# Patient Record
Sex: FEMALE | Race: WHITE | NOT HISPANIC OR LATINO | Employment: FULL TIME | URBAN - NONMETROPOLITAN AREA
[De-identification: names, ages, dates, MRNs, and addresses within clinical notes are randomized per-mention and may not be internally consistent; named-entity substitution may affect disease eponyms.]

---

## 2018-01-18 ENCOUNTER — OFFICE VISIT (OUTPATIENT)
Dept: NEUROSURGERY | Facility: CLINIC | Age: 58
End: 2018-01-18

## 2018-01-18 VITALS
WEIGHT: 196 LBS | DIASTOLIC BLOOD PRESSURE: 70 MMHG | SYSTOLIC BLOOD PRESSURE: 180 MMHG | HEIGHT: 64 IN | BODY MASS INDEX: 33.46 KG/M2

## 2018-01-18 DIAGNOSIS — E66.3 OVER WEIGHT: ICD-10-CM

## 2018-01-18 DIAGNOSIS — F17.210 CIGARETTE SMOKER: ICD-10-CM

## 2018-01-18 DIAGNOSIS — M54.12 CERVICAL RADICULOPATHY: Primary | ICD-10-CM

## 2018-01-18 PROCEDURE — 99203 OFFICE O/P NEW LOW 30 MIN: CPT | Performed by: NEUROLOGICAL SURGERY

## 2018-01-18 RX ORDER — OSELTAMIVIR PHOSPHATE 75 MG/1
CAPSULE ORAL
COMMUNITY
Start: 2017-12-30

## 2018-01-18 RX ORDER — GABAPENTIN 300 MG/1
300 CAPSULE ORAL 3 TIMES DAILY
Qty: 90 CAPSULE | Refills: 0 | Status: SHIPPED | OUTPATIENT
Start: 2018-01-18

## 2018-01-18 RX ORDER — HYDROCODONE BITARTRATE AND ACETAMINOPHEN 5; 325 MG/1; MG/1
TABLET ORAL
COMMUNITY
Start: 2017-12-27

## 2018-01-18 RX ORDER — ALPRAZOLAM 0.25 MG/1
TABLET ORAL
COMMUNITY
Start: 2017-12-27

## 2018-01-18 RX ORDER — CYCLOBENZAPRINE HCL 5 MG
TABLET ORAL
COMMUNITY
Start: 2017-11-21

## 2018-01-18 RX ORDER — METOPROLOL TARTRATE 50 MG/1
TABLET, FILM COATED ORAL
COMMUNITY
Start: 2017-11-21

## 2018-01-18 RX ORDER — KETOROLAC TROMETHAMINE 10 MG/1
TABLET, FILM COATED ORAL
COMMUNITY
Start: 2017-12-21

## 2018-01-18 RX ORDER — GABAPENTIN 100 MG/1
CAPSULE ORAL
Qty: 21 CAPSULE | Refills: 0 | Status: SHIPPED | OUTPATIENT
Start: 2018-01-18

## 2018-01-18 RX ORDER — TIZANIDINE 4 MG/1
TABLET ORAL
COMMUNITY
Start: 2017-12-18

## 2018-01-18 RX ORDER — MELOXICAM 15 MG/1
TABLET ORAL
COMMUNITY
Start: 2017-12-18

## 2018-01-18 NOTE — PROGRESS NOTES
"    Chief complaint   Chief Complaint   Patient presents with   • Neck Pain   • Arm Pain     right arm pain        Subjective     HPI:     This patient is a 57-year-old female who underwent an anterior cervical discectomy and fusion in 2013 with a work-related injury causing left-sided cervical radiculopathy.  She presents with a 6 history of a right-sided C6 radiculopathy after lifting a bale of straw.  She states since that time she has had constant neck pain and right arm pain and right severity level is 5-8 out of 10.  She has had steroid injections, nonsteroidal anti-inflammatories, Toradol, and Lortab without significant relief of her pain.  However as time has passed, she states that her pain has improved somewhat.  She has not had physical therapy or pain management.  She does not endorse cervical myelopathy.    Review of Systems     A 12 point review of systems is obtained and is negative except for as described in HPI    Past Medical History:   Diagnosis Date   • Hypertension      Past Surgical History:   Procedure Laterality Date   • CATARACT EXTRACTION     • HYSTERECTOMY     • NECK SURGERY     • ROTATOR CUFF REPAIR Right      History reviewed. No pertinent family history.  Social History   Substance Use Topics   • Smoking status: Current Every Day Smoker     Packs/day: 1.00     Types: Cigarettes   • Smokeless tobacco: Never Used   • Alcohol use No       (Not in a hospital admission)  Allergies:  Review of patient's allergies indicates no known allergies.    Objective      Vital Signs  /70  Ht 162.6 cm (64\")  Wt 88.9 kg (196 lb)  BMI 33.64 kg/m2    Physical Exam    No acute distress  Awake alert oriented ×3  HEENT atraumatic, normocephalic  Neck supple  Abdomen soft, nontender  Extremities no clubbing, edema, cyanosis  Neurologic exam  Cranial nerves II through XII grossly intact  No pronator drift  Patient moves all extremities 5 out of 5 strength  Sensation is intact light touch in upper and " lower extremities  No long tract signs  Gait normal  2+ biceps reflex, 2+ patellar reflex      Results Review:     MRI cervical spine reveals previous C4, 5 anterior cervical discectomy and fusion with a small C5, 6 disc herniation that is slightly eccentric to the left          Assessment/Plan:    57-year-old female with a 6 week history of right sided C6 radiculopathy and a adjacent level disease with C5, 6 disc herniation after lifting a bale of hay.  Given that her symptoms have mildly improved with time, we will refer her to physical therapy and pain management.  We will also start Neurontin.  We will follow-up with her in 3 months or sooner with questions or concerns.  I have discussed surgical intervention if she does fail a trial of conservative management.  Overall, patient is very much not interested in surgery at this time.  Thank you for this consultation.    I discussed the patients findings and my recommendations with patient    Kirby Murillo MD  01/18/18  1:45 PM